# Patient Record
Sex: MALE | Race: WHITE | Employment: OTHER | URBAN - METROPOLITAN AREA
[De-identification: names, ages, dates, MRNs, and addresses within clinical notes are randomized per-mention and may not be internally consistent; named-entity substitution may affect disease eponyms.]

---

## 2017-05-25 ENCOUNTER — EMPLOYEE WELLNESS (OUTPATIENT)
Dept: OTHER | Age: 67
End: 2017-05-25

## 2017-05-25 LAB
CHOLESTEROL/HDL RATIO: 3.8
CHOLESTEROL: 179 MG/DL
GLUCOSE BLD-MCNC: 123 MG/DL (ref 70–99)
HDLC SERPL-MCNC: 47 MG/DL
LDL CHOLESTEROL: 115 MG/DL (ref 0–130)
PATIENT FASTING?: YES
TRIGL SERPL-MCNC: 83 MG/DL
VLDLC SERPL CALC-MCNC: ABNORMAL MG/DL (ref 1–30)

## 2018-03-20 VITALS — WEIGHT: 223 LBS | BODY MASS INDEX: 29.42 KG/M2

## 2018-05-24 ENCOUNTER — EMPLOYEE WELLNESS (OUTPATIENT)
Dept: OTHER | Age: 68
End: 2018-05-24

## 2018-05-24 LAB
CHOLESTEROL/HDL RATIO: 3.9
CHOLESTEROL: 183 MG/DL
ESTIMATED AVERAGE GLUCOSE: 131 MG/DL
GLUCOSE BLD-MCNC: 139 MG/DL (ref 70–99)
HBA1C MFR BLD: 6.2 % (ref 4–6)
HDLC SERPL-MCNC: 47 MG/DL
LDL CHOLESTEROL: 117 MG/DL (ref 0–130)
PATIENT FASTING?: YES
TRIGL SERPL-MCNC: 94 MG/DL
VLDLC SERPL CALC-MCNC: ABNORMAL MG/DL (ref 1–30)

## 2018-05-29 VITALS — BODY MASS INDEX: 30.48 KG/M2 | WEIGHT: 231 LBS

## 2019-02-24 ENCOUNTER — HOSPITAL ENCOUNTER (EMERGENCY)
Age: 69
Discharge: HOME | End: 2019-02-24
Attending: EMERGENCY MEDICINE
Payer: COMMERCIAL

## 2019-02-24 ENCOUNTER — APPOINTMENT (OUTPATIENT)
Dept: GENERAL RADIOLOGY | Age: 69
End: 2019-02-24
Payer: COMMERCIAL

## 2019-02-24 ENCOUNTER — NURSE TRIAGE (OUTPATIENT)
Dept: OTHER | Facility: CLINIC | Age: 69
End: 2019-02-24

## 2019-02-24 VITALS
DIASTOLIC BLOOD PRESSURE: 79 MMHG | HEART RATE: 93 BPM | BODY MASS INDEX: 30.09 KG/M2 | TEMPERATURE: 98.1 F | SYSTOLIC BLOOD PRESSURE: 166 MMHG | RESPIRATION RATE: 18 BRPM | WEIGHT: 227 LBS | OXYGEN SATURATION: 95 % | HEIGHT: 73 IN

## 2019-02-24 DIAGNOSIS — M79.604 PAIN IN BOTH LOWER EXTREMITIES: Primary | ICD-10-CM

## 2019-02-24 DIAGNOSIS — M79.605 PAIN IN BOTH LOWER EXTREMITIES: Primary | ICD-10-CM

## 2019-02-24 LAB
ABSOLUTE EOS #: 0.12 K/UL (ref 0–0.44)
ABSOLUTE IMMATURE GRANULOCYTE: <0.03 K/UL (ref 0–0.3)
ABSOLUTE LYMPH #: 1.11 K/UL (ref 1.1–3.7)
ABSOLUTE MONO #: 0.68 K/UL (ref 0.1–1.2)
ALBUMIN SERPL-MCNC: 3.9 G/DL (ref 3.5–5.2)
ALBUMIN/GLOBULIN RATIO: 1.1 (ref 1–2.5)
ALP BLD-CCNC: 91 U/L (ref 40–129)
ALT SERPL-CCNC: 17 U/L (ref 5–41)
ANION GAP SERPL CALCULATED.3IONS-SCNC: 11 MMOL/L (ref 9–17)
AST SERPL-CCNC: 18 U/L
BASOPHILS # BLD: 1 % (ref 0–2)
BASOPHILS ABSOLUTE: 0.03 K/UL (ref 0–0.2)
BILIRUB SERPL-MCNC: 0.3 MG/DL (ref 0.3–1.2)
BUN BLDV-MCNC: 13 MG/DL (ref 8–23)
BUN/CREAT BLD: ABNORMAL (ref 9–20)
C-REACTIVE PROTEIN: 27 MG/L (ref 0–5)
CALCIUM IONIZED: 1.16 MMOL/L (ref 1.13–1.33)
CALCIUM SERPL-MCNC: 9 MG/DL (ref 8.6–10.4)
CHLORIDE BLD-SCNC: 109 MMOL/L (ref 98–107)
CO2: 24 MMOL/L (ref 20–31)
CREAT SERPL-MCNC: 0.77 MG/DL (ref 0.7–1.2)
DIFFERENTIAL TYPE: ABNORMAL
EOSINOPHILS RELATIVE PERCENT: 2 % (ref 1–4)
GFR AFRICAN AMERICAN: >60 ML/MIN
GFR NON-AFRICAN AMERICAN: >60 ML/MIN
GFR SERPL CREATININE-BSD FRML MDRD: ABNORMAL ML/MIN/{1.73_M2}
GFR SERPL CREATININE-BSD FRML MDRD: ABNORMAL ML/MIN/{1.73_M2}
GLUCOSE BLD-MCNC: 154 MG/DL (ref 70–99)
HCT VFR BLD CALC: 42.1 % (ref 40.7–50.3)
HEMOGLOBIN: 13.7 G/DL (ref 13–17)
IMMATURE GRANULOCYTES: 0 %
LYMPHOCYTES # BLD: 18 % (ref 24–43)
MAGNESIUM: 2.3 MG/DL (ref 1.6–2.6)
MCH RBC QN AUTO: 29.8 PG (ref 25.2–33.5)
MCHC RBC AUTO-ENTMCNC: 32.5 G/DL (ref 28.4–34.8)
MCV RBC AUTO: 91.7 FL (ref 82.6–102.9)
MONOCYTES # BLD: 11 % (ref 3–12)
MYOGLOBIN: 31 NG/ML (ref 28–72)
NRBC AUTOMATED: 0 PER 100 WBC
PDW BLD-RTO: 12.3 % (ref 11.8–14.4)
PHOSPHORUS: 2.6 MG/DL (ref 2.5–4.5)
PLATELET # BLD: 211 K/UL (ref 138–453)
PLATELET ESTIMATE: ABNORMAL
PMV BLD AUTO: 9.7 FL (ref 8.1–13.5)
POTASSIUM SERPL-SCNC: 4.1 MMOL/L (ref 3.7–5.3)
RBC # BLD: 4.59 M/UL (ref 4.21–5.77)
RBC # BLD: ABNORMAL 10*6/UL
SEDIMENTATION RATE, ERYTHROCYTE: 41 MM (ref 0–10)
SEG NEUTROPHILS: 68 % (ref 36–65)
SEGMENTED NEUTROPHILS ABSOLUTE COUNT: 4.37 K/UL (ref 1.5–8.1)
SODIUM BLD-SCNC: 144 MMOL/L (ref 135–144)
TOTAL CK: 107 U/L (ref 39–308)
TOTAL PROTEIN: 7.3 G/DL (ref 6.4–8.3)
WBC # BLD: 6.3 K/UL (ref 3.5–11.3)
WBC # BLD: ABNORMAL 10*3/UL

## 2019-02-24 PROCEDURE — 83874 ASSAY OF MYOGLOBIN: CPT

## 2019-02-24 PROCEDURE — 72170 X-RAY EXAM OF PELVIS: CPT

## 2019-02-24 PROCEDURE — 84100 ASSAY OF PHOSPHORUS: CPT

## 2019-02-24 PROCEDURE — 6370000000 HC RX 637 (ALT 250 FOR IP): Performed by: EMERGENCY MEDICINE

## 2019-02-24 PROCEDURE — 99283 EMERGENCY DEPT VISIT LOW MDM: CPT

## 2019-02-24 PROCEDURE — 80053 COMPREHEN METABOLIC PANEL: CPT

## 2019-02-24 PROCEDURE — 83735 ASSAY OF MAGNESIUM: CPT

## 2019-02-24 PROCEDURE — 82330 ASSAY OF CALCIUM: CPT

## 2019-02-24 PROCEDURE — 82550 ASSAY OF CK (CPK): CPT

## 2019-02-24 PROCEDURE — 86140 C-REACTIVE PROTEIN: CPT

## 2019-02-24 PROCEDURE — 85651 RBC SED RATE NONAUTOMATED: CPT

## 2019-02-24 PROCEDURE — 85025 COMPLETE CBC W/AUTO DIFF WBC: CPT

## 2019-02-24 RX ORDER — OXYCODONE HYDROCHLORIDE AND ACETAMINOPHEN 5; 325 MG/1; MG/1
1 TABLET ORAL EVERY 6 HOURS PRN
Qty: 12 TABLET | Refills: 0 | Status: SHIPPED | OUTPATIENT
Start: 2019-02-24 | End: 2019-02-27

## 2019-02-24 RX ORDER — MAGNESIUM 30 MG
30 TABLET ORAL DAILY
COMMUNITY

## 2019-02-24 RX ORDER — ACETAMINOPHEN 500 MG
1000 TABLET ORAL ONCE
Status: COMPLETED | OUTPATIENT
Start: 2019-02-24 | End: 2019-02-24

## 2019-02-24 RX ORDER — DOCUSATE SODIUM 100 MG/1
100 CAPSULE, LIQUID FILLED ORAL 2 TIMES DAILY
Qty: 10 CAPSULE | Refills: 0 | Status: SHIPPED | OUTPATIENT
Start: 2019-02-24 | End: 2019-02-27

## 2019-02-24 RX ADMIN — ACETAMINOPHEN 1000 MG: 500 TABLET ORAL at 11:27

## 2019-02-24 ASSESSMENT — ENCOUNTER SYMPTOMS
SORE THROAT: 0
NAUSEA: 0
BACK PAIN: 1
SHORTNESS OF BREATH: 0
ABDOMINAL PAIN: 0

## 2019-02-24 ASSESSMENT — PAIN DESCRIPTION - PAIN TYPE: TYPE: ACUTE PAIN

## 2019-02-24 ASSESSMENT — PAIN DESCRIPTION - ORIENTATION: ORIENTATION: RIGHT;LEFT

## 2019-02-24 ASSESSMENT — PAIN DESCRIPTION - LOCATION: LOCATION: LEG

## 2019-02-24 ASSESSMENT — PAIN SCALES - GENERAL
PAINLEVEL_OUTOF10: 6
PAINLEVEL_OUTOF10: 6

## 2019-02-24 ASSESSMENT — PAIN DESCRIPTION - DESCRIPTORS: DESCRIPTORS: BURNING;SORE

## 2019-03-27 ENCOUNTER — HOSPITAL ENCOUNTER (OUTPATIENT)
Dept: VASCULAR LAB | Age: 69
Discharge: HOME | End: 2019-03-27
Payer: COMMERCIAL

## 2019-03-27 PROCEDURE — 76706 US ABDL AORTA SCREEN AAA: CPT

## 2019-04-19 ENCOUNTER — HOSPITAL ENCOUNTER (OUTPATIENT)
Age: 69
Discharge: HOME | End: 2019-04-19
Payer: COMMERCIAL

## 2019-04-19 LAB
HIGH SENSITIVE C-REACTIVE PROTEIN: 37.8 MG/L
SEDIMENTATION RATE, ERYTHROCYTE: 53 MM (ref 0–10)

## 2019-04-19 PROCEDURE — 86141 C-REACTIVE PROTEIN HS: CPT

## 2019-04-19 PROCEDURE — 36415 COLL VENOUS BLD VENIPUNCTURE: CPT

## 2019-04-19 PROCEDURE — 85651 RBC SED RATE NONAUTOMATED: CPT

## 2019-06-25 ENCOUNTER — EMPLOYEE WELLNESS (OUTPATIENT)
Dept: OTHER | Age: 69
End: 2019-06-25

## 2019-06-25 LAB
CHOLESTEROL/HDL RATIO: 4.3
CHOLESTEROL: 196 MG/DL
ESTIMATED AVERAGE GLUCOSE: 177 MG/DL
GLUCOSE BLD-MCNC: 128 MG/DL (ref 70–99)
HBA1C MFR BLD: 7.8 % (ref 4–6)
HDLC SERPL-MCNC: 46 MG/DL
LDL CHOLESTEROL: 129 MG/DL (ref 0–130)
PATIENT FASTING?: YES
TRIGL SERPL-MCNC: 104 MG/DL
VLDLC SERPL CALC-MCNC: ABNORMAL MG/DL (ref 1–30)

## 2019-07-01 VITALS — WEIGHT: 220 LBS | BODY MASS INDEX: 29.03 KG/M2

## 2019-08-01 ENCOUNTER — HOSPITAL ENCOUNTER (OUTPATIENT)
Age: 69
Discharge: HOME | End: 2019-08-01
Payer: COMMERCIAL

## 2019-08-01 LAB — D-DIMER QUANTITATIVE: 4.93 MG/L FEU

## 2019-08-01 PROCEDURE — 36415 COLL VENOUS BLD VENIPUNCTURE: CPT

## 2019-08-01 PROCEDURE — 85379 FIBRIN DEGRADATION QUANT: CPT

## 2019-08-08 ENCOUNTER — HOSPITAL ENCOUNTER (OUTPATIENT)
Age: 69
Discharge: HOME | End: 2019-08-08
Payer: COMMERCIAL

## 2019-08-08 LAB
C-REACTIVE PROTEIN: 6.4 MG/L (ref 0–5)
SEDIMENTATION RATE, ERYTHROCYTE: 17 MM (ref 0–10)

## 2019-08-08 PROCEDURE — 85651 RBC SED RATE NONAUTOMATED: CPT

## 2019-08-08 PROCEDURE — 36415 COLL VENOUS BLD VENIPUNCTURE: CPT

## 2019-08-08 PROCEDURE — 86140 C-REACTIVE PROTEIN: CPT

## 2019-11-04 ENCOUNTER — HOSPITAL ENCOUNTER (OUTPATIENT)
Age: 69
Discharge: HOME | End: 2019-11-04
Payer: COMMERCIAL

## 2019-11-04 LAB
ALBUMIN SERPL-MCNC: 4 G/DL (ref 3.5–5.2)
ALP BLD-CCNC: 75 U/L (ref 40–129)
ALT SERPL-CCNC: 10 U/L (ref 5–41)
AST SERPL-CCNC: 16 U/L
C-REACTIVE PROTEIN: 7.2 MG/L (ref 0–5)
CREAT SERPL-MCNC: 0.91 MG/DL (ref 0.7–1.2)
GFR AFRICAN AMERICAN: >60 ML/MIN
GFR NON-AFRICAN AMERICAN: >60 ML/MIN
GFR SERPL CREATININE-BSD FRML MDRD: NORMAL ML/MIN/{1.73_M2}
GFR SERPL CREATININE-BSD FRML MDRD: NORMAL ML/MIN/{1.73_M2}
HCT VFR BLD CALC: 41.5 % (ref 40.7–50.3)
HEMOGLOBIN: 12.9 G/DL (ref 13–17)
HEPATITIS B SURFACE ANTIGEN: NONREACTIVE
HEPATITIS C ANTIBODY: NONREACTIVE
MCH RBC QN AUTO: 29.6 PG (ref 25.2–33.5)
MCHC RBC AUTO-ENTMCNC: 31.1 G/DL (ref 28.4–34.8)
MCV RBC AUTO: 95.2 FL (ref 82.6–102.9)
NRBC AUTOMATED: 0 PER 100 WBC
PDW BLD-RTO: 12.7 % (ref 11.8–14.4)
PLATELET # BLD: 189 K/UL (ref 138–453)
PMV BLD AUTO: 10.2 FL (ref 8.1–13.5)
RBC # BLD: 4.36 M/UL (ref 4.21–5.77)
SEDIMENTATION RATE, ERYTHROCYTE: 24 MM (ref 0–10)
WBC # BLD: 6.3 K/UL (ref 3.5–11.3)

## 2019-11-04 PROCEDURE — 84450 TRANSFERASE (AST) (SGOT): CPT

## 2019-11-04 PROCEDURE — 84460 ALANINE AMINO (ALT) (SGPT): CPT

## 2019-11-04 PROCEDURE — 86140 C-REACTIVE PROTEIN: CPT

## 2019-11-04 PROCEDURE — 82565 ASSAY OF CREATININE: CPT

## 2019-11-04 PROCEDURE — 85027 COMPLETE CBC AUTOMATED: CPT

## 2019-11-04 PROCEDURE — 84075 ASSAY ALKALINE PHOSPHATASE: CPT

## 2019-11-04 PROCEDURE — 82040 ASSAY OF SERUM ALBUMIN: CPT

## 2019-11-04 PROCEDURE — 36415 COLL VENOUS BLD VENIPUNCTURE: CPT

## 2019-11-04 PROCEDURE — 86803 HEPATITIS C AB TEST: CPT

## 2019-11-04 PROCEDURE — 87340 HEPATITIS B SURFACE AG IA: CPT

## 2019-11-04 PROCEDURE — 85651 RBC SED RATE NONAUTOMATED: CPT

## 2019-11-08 ENCOUNTER — HOSPITAL ENCOUNTER (OUTPATIENT)
Dept: VASCULAR LAB | Age: 69
Discharge: HOME | End: 2019-11-08
Payer: COMMERCIAL

## 2019-11-08 PROCEDURE — 93971 EXTREMITY STUDY: CPT

## 2020-01-15 ENCOUNTER — HOSPITAL ENCOUNTER (OUTPATIENT)
Age: 70
Discharge: HOME | End: 2020-01-15
Payer: COMMERCIAL

## 2020-01-15 LAB
C-REACTIVE PROTEIN: 2.4 MG/L (ref 0–5)
SEDIMENTATION RATE, ERYTHROCYTE: 11 MM (ref 0–10)

## 2020-01-15 PROCEDURE — 36415 COLL VENOUS BLD VENIPUNCTURE: CPT

## 2020-01-15 PROCEDURE — 85651 RBC SED RATE NONAUTOMATED: CPT

## 2020-01-15 PROCEDURE — 86140 C-REACTIVE PROTEIN: CPT

## 2020-04-07 ENCOUNTER — HOSPITAL ENCOUNTER (OUTPATIENT)
Age: 70
Discharge: HOME | End: 2020-04-07
Payer: COMMERCIAL

## 2020-04-07 LAB
HIGH SENSITIVE C-REACTIVE PROTEIN: 2.5 MG/L
SEDIMENTATION RATE, ERYTHROCYTE: 13 MM (ref 0–10)

## 2020-04-07 PROCEDURE — 86141 C-REACTIVE PROTEIN HS: CPT

## 2020-04-07 PROCEDURE — 36415 COLL VENOUS BLD VENIPUNCTURE: CPT

## 2020-04-07 PROCEDURE — 85651 RBC SED RATE NONAUTOMATED: CPT

## 2020-08-11 ENCOUNTER — HOSPITAL ENCOUNTER (OUTPATIENT)
Age: 70
Discharge: HOME | End: 2020-08-11
Payer: COMMERCIAL

## 2020-08-11 LAB
ABSOLUTE EOS #: 0.17 K/UL (ref 0–0.44)
ABSOLUTE IMMATURE GRANULOCYTE: <0.03 K/UL (ref 0–0.3)
ABSOLUTE LYMPH #: 1.38 K/UL (ref 1.1–3.7)
ABSOLUTE MONO #: 0.51 K/UL (ref 0.1–1.2)
ALBUMIN SERPL-MCNC: 4 G/DL (ref 3.5–5.2)
ALBUMIN/GLOBULIN RATIO: 1.7 (ref 1–2.5)
ALP BLD-CCNC: 69 U/L (ref 40–129)
ALT SERPL-CCNC: 11 U/L (ref 5–41)
ANION GAP SERPL CALCULATED.3IONS-SCNC: 10 MMOL/L (ref 9–17)
AST SERPL-CCNC: 18 U/L
BASOPHILS # BLD: 1 % (ref 0–2)
BASOPHILS ABSOLUTE: 0.03 K/UL (ref 0–0.2)
BILIRUB SERPL-MCNC: 0.39 MG/DL (ref 0.3–1.2)
BUN BLDV-MCNC: 21 MG/DL (ref 8–23)
BUN/CREAT BLD: ABNORMAL (ref 9–20)
CALCIUM SERPL-MCNC: 8.9 MG/DL (ref 8.6–10.4)
CHLORIDE BLD-SCNC: 100 MMOL/L (ref 98–107)
CO2: 27 MMOL/L (ref 20–31)
CREAT SERPL-MCNC: 1.2 MG/DL (ref 0.7–1.2)
DIFFERENTIAL TYPE: ABNORMAL
EOSINOPHILS RELATIVE PERCENT: 4 % (ref 1–4)
ESTIMATED AVERAGE GLUCOSE: 148 MG/DL
GFR AFRICAN AMERICAN: >60 ML/MIN
GFR NON-AFRICAN AMERICAN: 60 ML/MIN
GFR SERPL CREATININE-BSD FRML MDRD: ABNORMAL ML/MIN/{1.73_M2}
GFR SERPL CREATININE-BSD FRML MDRD: ABNORMAL ML/MIN/{1.73_M2}
GLUCOSE BLD-MCNC: 141 MG/DL (ref 70–99)
HBA1C MFR BLD: 6.8 % (ref 4–6)
HCT VFR BLD CALC: 39.8 % (ref 40.7–50.3)
HEMOGLOBIN: 13.4 G/DL (ref 13–17)
HIGH SENSITIVE C-REACTIVE PROTEIN: 3 MG/L
IMMATURE GRANULOCYTES: 0 %
LYMPHOCYTES # BLD: 30 % (ref 24–43)
MCH RBC QN AUTO: 31.4 PG (ref 25.2–33.5)
MCHC RBC AUTO-ENTMCNC: 33.7 G/DL (ref 28.4–34.8)
MCV RBC AUTO: 93.2 FL (ref 82.6–102.9)
MONOCYTES # BLD: 11 % (ref 3–12)
NRBC AUTOMATED: 0 PER 100 WBC
PDW BLD-RTO: 12.8 % (ref 11.8–14.4)
PLATELET # BLD: 161 K/UL (ref 138–453)
PLATELET ESTIMATE: ABNORMAL
PMV BLD AUTO: 10.2 FL (ref 8.1–13.5)
POTASSIUM SERPL-SCNC: 5.1 MMOL/L (ref 3.7–5.3)
RBC # BLD: 4.27 M/UL (ref 4.21–5.77)
RBC # BLD: ABNORMAL 10*6/UL
SEDIMENTATION RATE, ERYTHROCYTE: 10 MM (ref 0–20)
SEG NEUTROPHILS: 54 % (ref 36–65)
SEGMENTED NEUTROPHILS ABSOLUTE COUNT: 2.5 K/UL (ref 1.5–8.1)
SODIUM BLD-SCNC: 137 MMOL/L (ref 135–144)
TOTAL PROTEIN: 6.3 G/DL (ref 6.4–8.3)
WBC # BLD: 4.6 K/UL (ref 3.5–11.3)
WBC # BLD: ABNORMAL 10*3/UL

## 2020-08-11 PROCEDURE — 85025 COMPLETE CBC W/AUTO DIFF WBC: CPT

## 2020-08-11 PROCEDURE — 85652 RBC SED RATE AUTOMATED: CPT

## 2020-08-11 PROCEDURE — 83036 HEMOGLOBIN GLYCOSYLATED A1C: CPT

## 2020-08-11 PROCEDURE — 86141 C-REACTIVE PROTEIN HS: CPT

## 2020-08-11 PROCEDURE — 36415 COLL VENOUS BLD VENIPUNCTURE: CPT

## 2020-08-11 PROCEDURE — 80053 COMPREHEN METABOLIC PANEL: CPT

## 2020-10-30 ENCOUNTER — NURSE TRIAGE (OUTPATIENT)
Dept: OTHER | Facility: CLINIC | Age: 70
End: 2020-10-30

## 2020-10-30 NOTE — TELEPHONE ENCOUNTER
Reason for Disposition   [1] Bleeding AND [2] won't stop after 10 minutes of direct pressure (using correct technique)    Answer Assessment - Initial Assessment Questions  1. APPEARANCE of INJURY: \"What does the injury look like? \"       Cut top of knuckle on left hand    2. SIZE: \"How large is the cut?\"       Size of erraser     3. BLEEDING: \"Is it bleeding now? \" If so, ask: \"Is it difficult to stop? \"       Bleeding comtinues to leak thru dressing    4. LOCATION: \"Where is the injury located? \"       Left hand over thunm joint    5. ONSET: \"How long ago did the injury occur? \"       Occurred 30 min ago    6. MECHANISM: \"Tell me how it happened. \"       Cut with a knife    7. TETANUS: \"When was the last tetanus booster? \"      Unsure    8. PREGNANCY: \"Is there any chance you are pregnant? \" \"When was your last menstrual period? \"    Protocols used: CUTS AND LACERATIONS-ADULT-    Patient's location of employment:[employer]  Location of injury:left hand thumb area  Time of injury: 1200  Last 4 of patient's SSN: [id number]  Location recommended for treatment: go to ed now

## 2020-11-09 ENCOUNTER — HOSPITAL ENCOUNTER (OUTPATIENT)
Age: 70
Discharge: HOME | End: 2020-11-09
Payer: COMMERCIAL

## 2020-11-09 LAB
HIGH SENSITIVE C-REACTIVE PROTEIN: 3.4 MG/L
SEDIMENTATION RATE, ERYTHROCYTE: 15 MM (ref 0–20)

## 2020-11-09 PROCEDURE — 36415 COLL VENOUS BLD VENIPUNCTURE: CPT

## 2020-11-09 PROCEDURE — 86141 C-REACTIVE PROTEIN HS: CPT

## 2020-11-09 PROCEDURE — 85652 RBC SED RATE AUTOMATED: CPT

## 2021-04-28 ENCOUNTER — HOSPITAL ENCOUNTER (OUTPATIENT)
Age: 71
Discharge: HOME | End: 2021-04-28
Payer: MEDICARE

## 2021-04-28 LAB
BILIRUBIN URINE: NEGATIVE
COLOR: YELLOW
COMMENT UA: NORMAL
CREATININE URINE: 155.6 MG/DL (ref 39–259)
FERRITIN: 222 UG/L (ref 30–400)
FOLATE: 11 NG/ML
GLUCOSE URINE: NEGATIVE
IRON SATURATION: 23 % (ref 20–55)
IRON: 72 UG/DL (ref 59–158)
KETONES, URINE: NEGATIVE
LEUKOCYTE ESTERASE, URINE: NEGATIVE
MICROALBUMIN/CREAT 24H UR: <12 MG/L
MICROALBUMIN/CREAT UR-RTO: NORMAL MCG/MG CREAT
NITRITE, URINE: NEGATIVE
PH UA: 5 (ref 5–8)
PHOSPHORUS: 3.4 MG/DL (ref 2.5–4.5)
PROSTATE SPECIFIC ANTIGEN: 2.35 UG/L
PROTEIN UA: NEGATIVE
PTH INTACT: 62.28 PG/ML (ref 15–65)
SPECIFIC GRAVITY UA: 1.02 (ref 1–1.03)
TOTAL IRON BINDING CAPACITY: 320 UG/DL (ref 250–450)
TURBIDITY: CLEAR
UNSATURATED IRON BINDING CAPACITY: 248 UG/DL (ref 112–347)
URINE HGB: NEGATIVE
UROBILINOGEN, URINE: NORMAL
VITAMIN B-12: 366 PG/ML (ref 232–1245)

## 2021-04-28 PROCEDURE — 82043 UR ALBUMIN QUANTITATIVE: CPT

## 2021-04-28 PROCEDURE — 84155 ASSAY OF PROTEIN SERUM: CPT

## 2021-04-28 PROCEDURE — 84100 ASSAY OF PHOSPHORUS: CPT

## 2021-04-28 PROCEDURE — 86038 ANTINUCLEAR ANTIBODIES: CPT

## 2021-04-28 PROCEDURE — 82570 ASSAY OF URINE CREATININE: CPT

## 2021-04-28 PROCEDURE — 82728 ASSAY OF FERRITIN: CPT

## 2021-04-28 PROCEDURE — G0103 PSA SCREENING: HCPCS

## 2021-04-28 PROCEDURE — 84166 PROTEIN E-PHORESIS/URINE/CSF: CPT

## 2021-04-28 PROCEDURE — 36415 COLL VENOUS BLD VENIPUNCTURE: CPT

## 2021-04-28 PROCEDURE — 83540 ASSAY OF IRON: CPT

## 2021-04-28 PROCEDURE — 83970 ASSAY OF PARATHORMONE: CPT

## 2021-04-28 PROCEDURE — 82746 ASSAY OF FOLIC ACID SERUM: CPT

## 2021-04-28 PROCEDURE — 81003 URINALYSIS AUTO W/O SCOPE: CPT

## 2021-04-28 PROCEDURE — 84165 PROTEIN E-PHORESIS SERUM: CPT

## 2021-04-28 PROCEDURE — 82607 VITAMIN B-12: CPT

## 2021-04-28 PROCEDURE — 83550 IRON BINDING TEST: CPT

## 2021-04-28 PROCEDURE — 84156 ASSAY OF PROTEIN URINE: CPT

## 2021-04-29 LAB — ANTI-NUCLEAR ANTIBODY (ANA): NEGATIVE

## 2021-04-30 LAB
ALBUMIN (CALCULATED): 4.6 G/DL (ref 3.2–5.2)
ALBUMIN PERCENT: 66 % (ref 45–65)
ALPHA 1 PERCENT: 3 % (ref 3–6)
ALPHA 2 PERCENT: 10 % (ref 6–13)
ALPHA-1-GLOBULIN: 0.2 G/DL (ref 0.1–0.4)
ALPHA-2-GLOBULIN: 0.7 G/DL (ref 0.5–0.9)
BETA GLOBULIN: 0.8 G/DL (ref 0.5–1.1)
BETA PERCENT: 11 % (ref 11–19)
GAMMA GLOBULIN %: 10 % (ref 9–20)
GAMMA GLOBULIN: 0.7 G/DL (ref 0.5–1.5)
PATHOLOGIST: ABNORMAL
PROTEIN ELECTROPHORESIS, SERUM: ABNORMAL
TOTAL PROT. SUM,%: 100 % (ref 98–102)
TOTAL PROT. SUM: 7 G/DL (ref 6.3–8.2)
TOTAL PROTEIN: 6.9 G/DL (ref 6.4–8.3)

## 2021-05-01 LAB
P E INTERPRETATION, U: NORMAL
PATHOLOGIST: NORMAL
SPECIMEN TYPE: NORMAL
URINE TOTAL PROTEIN: 8 MG/DL